# Patient Record
Sex: MALE | Race: WHITE | NOT HISPANIC OR LATINO
[De-identification: names, ages, dates, MRNs, and addresses within clinical notes are randomized per-mention and may not be internally consistent; named-entity substitution may affect disease eponyms.]

---
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
## 2024-02-08 ENCOUNTER — APPOINTMENT (OUTPATIENT)
Dept: OTOLARYNGOLOGY | Facility: CLINIC | Age: 24
End: 2024-02-08
Payer: COMMERCIAL

## 2024-02-08 VITALS — HEIGHT: 70 IN | WEIGHT: 195 LBS | BODY MASS INDEX: 27.92 KG/M2

## 2024-02-08 DIAGNOSIS — Z78.9 OTHER SPECIFIED HEALTH STATUS: ICD-10-CM

## 2024-02-08 DIAGNOSIS — Z83.3 FAMILY HISTORY OF DIABETES MELLITUS: ICD-10-CM

## 2024-02-08 DIAGNOSIS — Z87.39 PERSONAL HISTORY OF OTHER DISEASES OF THE MUSCULOSKELETAL SYSTEM AND CONNECTIVE TISSUE: ICD-10-CM

## 2024-02-08 DIAGNOSIS — Z86.69 PERSONAL HISTORY OF OTHER DISEASES OF THE NERVOUS SYSTEM AND SENSE ORGANS: ICD-10-CM

## 2024-02-08 PROCEDURE — 92557 COMPREHENSIVE HEARING TEST: CPT

## 2024-02-08 PROCEDURE — 92567 TYMPANOMETRY: CPT

## 2024-02-08 PROCEDURE — 99203 OFFICE O/P NEW LOW 30 MIN: CPT

## 2024-02-09 NOTE — ASSESSMENT
Prep Survey      Responses   Facility patient discharged from?  [facility]   Is patient eligible?  Yes   Discharge diagnosis  Coronary artery bypass grafting x4    Does the patient have one of the following disease processes/diagnoses(primary or secondary)?  Cardiothoracic surgery   Does the patient have Home health ordered?  No   Is there a DME ordered?  No   Prep survey completed?  Yes          Shirley Newell RN         [FreeTextEntry1] : the audiogram was ordered by me and interpreted by me today and the results are as follows-symmetric normal pure-tone hearing and speech discrimination scores.  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  I recommend he meet with Dr. Nelson for consideration for tympanoplasty.

## 2024-02-09 NOTE — HISTORY OF PRESENT ILLNESS
[de-identified] : Initial visit. He presents today complaining of decreased hearing in his left ear. He reports that as a child he had a traumatic tympanic membrane perforation. I am reviewing the note in his chart from 2017 that reports a tympanic membrane perforation that healed. He feels that perhaps the perforation recurs. No otorrhea. No tinnitus. No recent head trauma. No other complaints other than he perceives his hearing to be worse on the left.

## 2024-02-21 ENCOUNTER — APPOINTMENT (OUTPATIENT)
Dept: OTOLARYNGOLOGY | Facility: CLINIC | Age: 24
End: 2024-02-21
Payer: COMMERCIAL

## 2024-02-21 DIAGNOSIS — H90.12 CONDUCTIVE HEARING LOSS, UNILATERAL, LEFT EAR, WITH UNRESTRICTED HEARING ON THE CONTRALATERAL SIDE: ICD-10-CM

## 2024-02-21 DIAGNOSIS — H72.02 CENTRAL PERFORATION OF TYMPANIC MEMBRANE, LEFT EAR: ICD-10-CM

## 2024-02-21 PROCEDURE — 99212 OFFICE O/P EST SF 10 MIN: CPT
